# Patient Record
Sex: FEMALE | Race: AMERICAN INDIAN OR ALASKA NATIVE
[De-identification: names, ages, dates, MRNs, and addresses within clinical notes are randomized per-mention and may not be internally consistent; named-entity substitution may affect disease eponyms.]

---

## 2018-02-24 ENCOUNTER — HOSPITAL ENCOUNTER (INPATIENT)
Dept: HOSPITAL 5 - LD | Age: 28
LOS: 2 days | Discharge: HOME | End: 2018-02-26
Attending: OBSTETRICS & GYNECOLOGY | Admitting: OBSTETRICS & GYNECOLOGY
Payer: COMMERCIAL

## 2018-02-24 DIAGNOSIS — Z23: ICD-10-CM

## 2018-02-24 DIAGNOSIS — D64.9: ICD-10-CM

## 2018-02-24 DIAGNOSIS — Z3A.40: ICD-10-CM

## 2018-02-24 LAB
ALBUMIN SERPL-MCNC: 3.2 G/DL (ref 3.9–5)
ALT SERPL-CCNC: 6 UNITS/L (ref 7–56)
BUN SERPL-MCNC: 5 MG/DL (ref 7–17)
BUN/CREAT SERPL: 13 %
CALCIUM SERPL-MCNC: 8.7 MG/DL (ref 8.4–10.2)
HCT VFR BLD CALC: 30.5 % (ref 30.3–42.9)
HCT VFR BLD CALC: 34 % (ref 30.3–42.9)
HEMOLYSIS INDEX: 7
HGB BLD-MCNC: 11.5 GM/DL (ref 10.1–14.3)
HGB BLD-MCNC: 9.9 GM/DL (ref 10.1–14.3)
MCH RBC QN AUTO: 29 PG (ref 28–32)
MCHC RBC AUTO-ENTMCNC: 34 % (ref 30–34)
MCV RBC AUTO: 87 FL (ref 79–97)
PLATELET # BLD: 318 K/MM3 (ref 140–440)
RBC # BLD AUTO: 3.89 M/MM3 (ref 3.65–5.03)

## 2018-02-24 PROCEDURE — G0463 HOSPITAL OUTPT CLINIC VISIT: HCPCS

## 2018-02-24 PROCEDURE — 99211 OFF/OP EST MAY X REQ PHY/QHP: CPT

## 2018-02-24 PROCEDURE — 90707 MMR VACCINE SC: CPT

## 2018-02-24 PROCEDURE — 86592 SYPHILIS TEST NON-TREP QUAL: CPT

## 2018-02-24 PROCEDURE — 3E0234Z INTRODUCTION OF SERUM, TOXOID AND VACCINE INTO MUSCLE, PERCUTANEOUS APPROACH: ICD-10-PCS | Performed by: OBSTETRICS & GYNECOLOGY

## 2018-02-24 PROCEDURE — 86901 BLOOD TYPING SEROLOGIC RH(D): CPT

## 2018-02-24 PROCEDURE — 85014 HEMATOCRIT: CPT

## 2018-02-24 PROCEDURE — 0KQM0ZZ REPAIR PERINEUM MUSCLE, OPEN APPROACH: ICD-10-PCS | Performed by: OBSTETRICS & GYNECOLOGY

## 2018-02-24 PROCEDURE — 86900 BLOOD TYPING SEROLOGIC ABO: CPT

## 2018-02-24 PROCEDURE — 85018 HEMOGLOBIN: CPT

## 2018-02-24 PROCEDURE — 85027 COMPLETE CBC AUTOMATED: CPT

## 2018-02-24 PROCEDURE — 36415 COLL VENOUS BLD VENIPUNCTURE: CPT

## 2018-02-24 PROCEDURE — 86850 RBC ANTIBODY SCREEN: CPT

## 2018-02-24 PROCEDURE — 80053 COMPREHEN METABOLIC PANEL: CPT

## 2018-02-24 RX ADMIN — IBUPROFEN SCH MG: 600 TABLET, FILM COATED ORAL at 17:52

## 2018-02-24 RX ADMIN — DOCUSATE SODIUM SCH MG: 100 CAPSULE, LIQUID FILLED ORAL at 11:02

## 2018-02-24 RX ADMIN — FERROUS SULFATE TAB 325 MG (65 MG ELEMENTAL FE) SCH MG: 325 (65 FE) TAB at 11:02

## 2018-02-24 RX ADMIN — HYDROCODONE BITARTRATE AND ACETAMINOPHEN PRN EACH: 5; 325 TABLET ORAL at 06:19

## 2018-02-24 RX ADMIN — IBUPROFEN SCH: 600 TABLET, FILM COATED ORAL at 14:25

## 2018-02-24 RX ADMIN — IBUPROFEN SCH MG: 600 TABLET, FILM COATED ORAL at 11:02

## 2018-02-24 RX ADMIN — Medication SCH EACH: at 11:02

## 2018-02-24 NOTE — HISTORY AND PHYSICAL REPORT
History of Present Illness


Date of examination: 18


Date of admission: 


18 01:05





Chief complaint: 





Labor


History of present illness: 





Pt is a 26yo BF  EDC 18;  EGA 40 3/7 weeks presents to L&D complaining 

of RUC's q 3-4 mins.  She received prenatal care at [facility] 

since 15 weeks and  course has been unremarkable.  Prenatal records 

were not available at delivery, but are available now, and GBS is Positive.





Past History


Past Medical History: no pertinent history


Past Surgical History: GYN/uterine surgery (salpingectomy for ectopic pregnancy)


Family/Genetic History: none


Social history: no significant social history, single





- Obstetrical History


Expected Date of Delivery: 18


Actual Gestation: 40 Week(s) 3 Day(s) 





Medications and Allergies


 Allergies











Allergy/AdvReac Type Severity Reaction Status Date / Time


 


cetirizine [From Zyrtec] Allergy  Shortness Verified 18 01:15





   of Breath  











 Home Medications











 Medication  Instructions  Recorded  Confirmed  Last Taken  Type


 


No Known Home Medications [No  18 Unknown History





Reported Home Medications]     














Review of Systems


All systems: negative





- Physical Exam


Breasts: Positive: deferred


Cardiovascular: Regular rate


Lungs: Positive: Clear to auscultation


Abdomen: Positive: normal appearance


Genitourinary (Female): Positive: normal external genitalia


Vagina: Positive: normal moisture


Uterus: Positive: enlarged


Extremities: Positive: normal





- Obstetrical


FHR: category 1


Uterine Contraction Monitor Mode: External


Cervical Dilatation: 5


Cervical Effacement Percentage: 90


Fetal station: -2


Uterine Contraction Pattern: Regular


Uterine Tone Measurement Phase: Contraction





Results


Result Diagrams: 


 18 01:05





 18 06:00


All other labs normal.








Assessment and Plan





- Patient Problems


(1) 40 weeks gestation of pregnancy


Onset Date: 18   Current Visit: Yes   Status: Acute   


Plan to address problem: 


A:  IUP @ 40 3/7 weeks in labor


      Unknown GBS





 P:  Admit to L&D for expectant vaginal delivery


      IV Ampicillin


      Obtain Medical records

## 2018-02-24 NOTE — PROCEDURE NOTE
OB Delivery Note





- Delivery


Date of Delivery: 18


Surgeon: PRIMO FLORIAN


Estimated blood loss: other (350cc)





- Vaginal


Delivery presentation: vertex


Delivery position: OA


Intrapartum events: none


Delivery induction: none


Delivery augmentation: rupture of membranes


Delivery monitor: external FHT, external uterine


Route of delivery: 


Delivery placenta: spontaneous


Delivery cord: nuchal cord (x1), 3 umbilical vessels


Episiotomy: none


Delivery laceration: 2nd degree (perineal)


Delivery repair: vicryl


Anesthesia: intravenous


Delivery comments: 





Infant delivered OA over intact perineum and placed on Mom's chest for skin-to-

skin bonding and delayed cord clamping.





- Infant


  ** A


Apgar at 1 minute: 8


Apgar at 5 minutes: 9


Infant Gender: Female (3120gms)

## 2018-02-25 RX ADMIN — FERROUS SULFATE TAB 325 MG (65 MG ELEMENTAL FE) SCH MG: 325 (65 FE) TAB at 12:16

## 2018-02-25 RX ADMIN — HYDROCODONE BITARTRATE AND ACETAMINOPHEN PRN EACH: 5; 325 TABLET ORAL at 20:39

## 2018-02-25 RX ADMIN — IBUPROFEN SCH: 600 TABLET, FILM COATED ORAL at 00:00

## 2018-02-25 RX ADMIN — FERROUS SULFATE TAB 325 MG (65 MG ELEMENTAL FE) SCH MG: 325 (65 FE) TAB at 22:11

## 2018-02-25 RX ADMIN — IBUPROFEN SCH MG: 600 TABLET, FILM COATED ORAL at 06:37

## 2018-02-25 RX ADMIN — HYDROCODONE BITARTRATE AND ACETAMINOPHEN PRN EACH: 5; 325 TABLET ORAL at 06:30

## 2018-02-25 RX ADMIN — IBUPROFEN PRN MG: 800 TABLET, FILM COATED ORAL at 00:27

## 2018-02-25 RX ADMIN — IBUPROFEN SCH MG: 600 TABLET, FILM COATED ORAL at 18:32

## 2018-02-25 RX ADMIN — Medication SCH EACH: at 12:16

## 2018-02-25 RX ADMIN — DOCUSATE SODIUM SCH MG: 100 CAPSULE, LIQUID FILLED ORAL at 00:27

## 2018-02-25 RX ADMIN — HYDROCODONE BITARTRATE AND ACETAMINOPHEN PRN EACH: 5; 325 TABLET ORAL at 12:18

## 2018-02-25 RX ADMIN — DOCUSATE SODIUM SCH MG: 100 CAPSULE, LIQUID FILLED ORAL at 12:16

## 2018-02-25 RX ADMIN — IBUPROFEN SCH MG: 600 TABLET, FILM COATED ORAL at 12:16

## 2018-02-25 RX ADMIN — DOCUSATE SODIUM SCH MG: 100 CAPSULE, LIQUID FILLED ORAL at 22:11

## 2018-02-25 RX ADMIN — FERROUS SULFATE TAB 325 MG (65 MG ELEMENTAL FE) SCH MG: 325 (65 FE) TAB at 00:27

## 2018-02-25 NOTE — DISCHARGE SUMMARY
Providers





- Providers


Date of Admission: 


18 01:05





Date of discharge: 18


Attending physician: 


PRIMO FLORIAN





Primary care physician: 


PRIMO FLORIAN








Hospitalization


Reason for admission: active labor, IUP at term


Delivery: 


Episiotomy: none


Laceration: 2nd degree


Other postpartum procedures: none


Postpartum complications: none


Discharge diagnosis: IUP at term delivered


[place] baby: female


Hospital course: 





Unremarkable.


Condition at discharge: Good


Disposition: DC-01 TO HOME OR SELFCARE





- Discharge Diagnoses


(1) 40 weeks gestation of pregnancy


Status: Resolved   





(2)  (normal spontaneous vaginal delivery)


Status: Resolved   





Plan





- Discharge Medications


Prescriptions: 


Ferrous Sulfate [Feosol 325 MG tab] 325 mg PO BID #60 tablet


HYDROcodone/APAP 5-325 [Norco 5-325 mg TAB] 1 each PO Q6H PRN #10 tablet


 PRN Reason: Pain, Moderate (4-6)


Ibuprofen [Motrin 600 MG tab] 600 mg PO Q6H #30 tablet


Prenatal Vit-Fe Fumar-FA [Prenatal Vitamin] 1 each PO QDAY #30 tablet





- Provider Discharge Summary


Activity: routine, no sex for 6 weeks, no heavy lifting 4 weeks, no strenuous 

exercise


Diet: routine


Instructions: routine


Additional instructions: 


[]  Smoking cessation referral if applicable(refer to patient education folder 

for contact #)


[]  Refer to [facility] Booklet








Call your doctor immediately for:


* Fever > 100.5


* Heavy vaginal bleeding ( >1 pad per hour)


* Severe persistent headache


* Shortness of breath


* Reddened, hot, painful area to leg or breast


* Drainage or odor from incision.





* Keep incision clean and dry at all times and follow doctor's instructions 

regarding bathing/showering











- Follow up plan


Follow up: 


PRIMO FLORIAN MD [Primary Care Provider] - 6 Weeks

## 2018-02-25 NOTE — PROGRESS NOTE
Assessment and Plan





- Patient Problems


(1) 40 weeks gestation of pregnancy


Onset Date: 18   Current Visit: Yes   Status: Resolved   





(2)  (normal spontaneous vaginal delivery)


Onset Date: 18   Current Visit: Yes   Status: Resolved   


Plan to address problem: 


A:  S/P  - PPD #1  Doing well


      Asymptomatic anemia - stable





 P:  May go home tomorrow.








Subjective





- Subjective


Date of service: 18


Principal diagnosis: s/p  - PPD #1


Interval history: 





Pt is feeling well without complaints.  Bleeding improved.


Patient reports: appetite normal, voiding normally, pain well controlled, 

ambulating normally


: doing well, nursing well, bottle feeding





Objective





- Vital Signs


Latest vital signs: 


 Vital Signs











  Temp Pulse Resp BP BP Pulse Ox


 


 18 06:37    18   


 


 18 06:30    20   


 


 18 01:27    18   


 


 18 01:25  98.2 F  61  20   120/77  97


 


 18 01:06  98.6 F     


 


 18 00:27    18   


 


 18 18:08  98.4 F  72  18   141/71  98


 


 18 12:02  98.6 F  77  18  128/75   97


 


 18 11:02    20   


 


 18 08:40  98.4 F  51 L  20   148/84  100


 


 18 08:24   55 L   160/87  








 Intake and Output











 18





 22:59 06:59 14:59


 


Intake Total 12 360 


 


Output Total 120  


 


Balance -108 360 


 


Intake:   


 


  Oral 12 360 


 


Output:   


 


  Urine 120  


 


    Void 120  


 


Other:   


 


  Total, Intake Amount 12 240 


 


  Total, Output Amount 120  


 


  # Voids   


 


    Void  1 














- Exam


Breasts: Present: deferred


Cardiovascular: Present: Regular rate


Lungs: Present: Clear to auscultation


Abdomen: Present: normal appearance, soft


Uterus: Present: normal, firm, fundal height below umbilicus


Extremities: Present: normal





- Labs


Labs: 


 Abnormal lab results











  18 Range/Units





  17:17 


 


Hgb  9.9 L  (10.1-14.3)  gm/dl








 Laboratory Tests











  18





  01:05 01:05 01:05


 


WBC  9.7  


 


RBC  3.89  


 


Hgb  11.5  


 


Hct  34.0  


 


MCV  87  


 


MCH  29  


 


MCHC  34  


 


RDW  13.3  


 


Plt Count  318  


 


Sodium   


 


Potassium   


 


Chloride   


 


Carbon Dioxide   


 


Anion Gap   


 


BUN   


 


Creatinine   


 


Estimated GFR   


 


BUN/Creatinine Ratio   


 


Glucose   


 


Calcium   


 


Total Bilirubin   


 


AST   


 


ALT   


 


Alkaline Phosphatase   


 


Total Protein   


 


Albumin   


 


Albumin/Globulin Ratio   


 


RPR   Nonreactive 


 


Blood Type    O POSITIVE


 


Antibody Screen    Negative














  18





  06:00 17:17


 


WBC  


 


RBC  


 


Hgb   9.9 L


 


Hct   30.5


 


MCV  


 


MCH  


 


MCHC  


 


RDW  


 


Plt Count  


 


Sodium  139 


 


Potassium  3.9 


 


Chloride  102.1 


 


Carbon Dioxide  21 L 


 


Anion Gap  20 


 


BUN  5 L 


 


Creatinine  0.4 L 


 


Estimated GFR  > 60 


 


BUN/Creatinine Ratio  13 


 


Glucose  88 


 


Calcium  8.7 


 


Total Bilirubin  0.50 


 


AST  17 


 


ALT  6 L 


 


Alkaline Phosphatase  174 H 


 


Total Protein  6.5 


 


Albumin  3.2 L 


 


Albumin/Globulin Ratio  1.0 


 


RPR  


 


Blood Type  


 


Antibody Screen

## 2018-02-26 VITALS — SYSTOLIC BLOOD PRESSURE: 140 MMHG | DIASTOLIC BLOOD PRESSURE: 70 MMHG

## 2018-02-26 RX ADMIN — IBUPROFEN SCH MG: 600 TABLET, FILM COATED ORAL at 05:19

## 2018-02-26 RX ADMIN — IBUPROFEN SCH: 600 TABLET, FILM COATED ORAL at 00:07

## 2018-02-26 RX ADMIN — DOCUSATE SODIUM SCH MG: 100 CAPSULE, LIQUID FILLED ORAL at 10:30

## 2018-02-26 RX ADMIN — FERROUS SULFATE TAB 325 MG (65 MG ELEMENTAL FE) SCH MG: 325 (65 FE) TAB at 10:30

## 2018-02-26 RX ADMIN — IBUPROFEN SCH MG: 600 TABLET, FILM COATED ORAL at 12:11

## 2018-02-26 RX ADMIN — IBUPROFEN PRN MG: 800 TABLET, FILM COATED ORAL at 00:02

## 2018-02-26 RX ADMIN — Medication SCH EACH: at 11:19
